# Patient Record
Sex: FEMALE | Race: WHITE | NOT HISPANIC OR LATINO | Employment: FULL TIME | URBAN - NONMETROPOLITAN AREA
[De-identification: names, ages, dates, MRNs, and addresses within clinical notes are randomized per-mention and may not be internally consistent; named-entity substitution may affect disease eponyms.]

---

## 2021-08-04 ENCOUNTER — TRANSFERRED RECORDS (OUTPATIENT)
Dept: HEALTH INFORMATION MANAGEMENT | Facility: OTHER | Age: 36
End: 2021-08-04

## 2021-08-12 ENCOUNTER — TRANSFERRED RECORDS (OUTPATIENT)
Dept: HEALTH INFORMATION MANAGEMENT | Facility: OTHER | Age: 36
End: 2021-08-12

## 2021-08-25 ENCOUNTER — PRENATAL OFFICE VISIT (OUTPATIENT)
Dept: OBGYN | Facility: OTHER | Age: 36
End: 2021-08-25
Attending: OBSTETRICS & GYNECOLOGY
Payer: COMMERCIAL

## 2021-08-25 VITALS — SYSTOLIC BLOOD PRESSURE: 118 MMHG | DIASTOLIC BLOOD PRESSURE: 70 MMHG | WEIGHT: 173.1 LBS | HEART RATE: 76 BPM

## 2021-08-25 DIAGNOSIS — O24.410 GDM, CLASS A1: ICD-10-CM

## 2021-08-25 DIAGNOSIS — Z01.812 PRE-PROCEDURE LAB EXAM: Primary | ICD-10-CM

## 2021-08-25 DIAGNOSIS — Z34.90 NORMAL PREGNANCY, ANTEPARTUM: ICD-10-CM

## 2021-08-25 PROCEDURE — 99207 PR OB VISIT-NO CHARGE - GICH ONLY: CPT | Performed by: OBSTETRICS & GYNECOLOGY

## 2021-08-25 NOTE — PROGRESS NOTES
CC: Recheck OB visit at 38w5d    HPI: Susie Castro presents for a routine OB visit now at 38w5d  Concerns: None  Patient notices normal fetal movement, denies contractions, vaginal bleeding or leaking of fluid.    OB History    Para Term  AB Living   1 0 0 0 0 0   SAB TAB Ectopic Multiple Live Births   0 0 0 0 0      # Outcome Date GA Lbr Terrell/2nd Weight Sex Delivery Anes PTL Lv   1 Current              Current Outpatient Medications   Medication     Prenatal MV-Min-Fe Fum-FA-DHA (PRENATAL 1 PO)     No current facility-administered medications for this visit.         O: /70 (BP Location: Right arm, Patient Position: Sitting, Cuff Size: Adult Regular)   Pulse 76   Wt 78.5 kg (173 lb 1.6 oz)   There is no height or weight on file to calculate BMI.  See OB flow sheet  EXAM:  NAD  EFW 7+ lbs  FHT:125 bpm  Cx 1/50/-2 posterior average consistency, cephalic    No results found for any visits on 21.    A/P: (Z01.812) Pre-procedure lab exam  (primary encounter diagnosis)  Comment:   Plan: Asymptomatic COVID-19 Virus (Coronavirus) by         PCR            (Z34.90) Normal pregnancy, antepartum  Comment:   Plan:     (O24.410) GDM, class A1  Comment:   Plan:     Induction planned for 21 due to 2+ hour distance from the hospital    Problem List:   G1  GDM A1    Benji Cortez MD FACOG  2:17 PM 2021

## 2021-08-25 NOTE — NURSING NOTE
FOOD SECURITY SCREENING QUESTIONS  Hunger Vital Signs:  Within the past 12 months we worried whether our food would run out before we got money to buy more. Never  Within the past 12 months the food we bought just didn't last and we didn't have money to get more. Never    Chief Complaint   Patient presents with     Prenatal Care     39 weeks       Initial There were no vitals taken for this visit. There is no height or weight on file to calculate BMI.  Medication Reconciliation: complete    Payton Price RN

## 2021-08-30 ENCOUNTER — ANESTHESIA EVENT (OUTPATIENT)
Dept: SURGERY | Facility: OTHER | Age: 36
End: 2021-08-30
Payer: COMMERCIAL

## 2021-08-30 ENCOUNTER — ANESTHESIA (OUTPATIENT)
Dept: SURGERY | Facility: OTHER | Age: 36
End: 2021-08-30
Payer: COMMERCIAL

## 2021-08-30 ENCOUNTER — HOSPITAL ENCOUNTER (INPATIENT)
Facility: OTHER | Age: 36
LOS: 2 days | Discharge: HOME | End: 2021-09-01
Attending: OBSTETRICS & GYNECOLOGY | Admitting: OBSTETRICS & GYNECOLOGY
Payer: COMMERCIAL

## 2021-08-30 DIAGNOSIS — Z98.891 S/P CESAREAN SECTION: Primary | ICD-10-CM

## 2021-08-30 PROBLEM — Z37.9 NORMAL LABOR: Status: ACTIVE | Noted: 2021-08-30

## 2021-08-30 LAB
ABO/RH(D): NORMAL
ANTIBODY SCREEN: NEGATIVE
BASOPHILS # BLD AUTO: 0 10E3/UL (ref 0–0.2)
BASOPHILS NFR BLD AUTO: 0 %
EOSINOPHIL # BLD AUTO: 0.4 10E3/UL (ref 0–0.7)
EOSINOPHIL NFR BLD AUTO: 3 %
ERYTHROCYTE [DISTWIDTH] IN BLOOD BY AUTOMATED COUNT: 12.7 % (ref 10–15)
HCT VFR BLD AUTO: 34.4 % (ref 35–47)
HGB BLD-MCNC: 12.2 G/DL (ref 11.7–15.7)
IMM GRANULOCYTES # BLD: 0.1 10E3/UL
IMM GRANULOCYTES NFR BLD: 1 %
LYMPHOCYTES # BLD AUTO: 2 10E3/UL (ref 0.8–5.3)
LYMPHOCYTES NFR BLD AUTO: 15 %
MCH RBC QN AUTO: 31.2 PG (ref 26.5–33)
MCHC RBC AUTO-ENTMCNC: 35.5 G/DL (ref 31.5–36.5)
MCV RBC AUTO: 88 FL (ref 78–100)
MONOCYTES # BLD AUTO: 0.5 10E3/UL (ref 0–1.3)
MONOCYTES NFR BLD AUTO: 4 %
NEUTROPHILS # BLD AUTO: 10.5 10E3/UL (ref 1.6–8.3)
NEUTROPHILS NFR BLD AUTO: 77 %
NRBC # BLD AUTO: 0 10E3/UL
NRBC BLD AUTO-RTO: 0 /100
PLATELET # BLD AUTO: 210 10E3/UL (ref 150–450)
RBC # BLD AUTO: 3.91 10E6/UL (ref 3.8–5.2)
SPECIMEN EXPIRATION DATE: NORMAL
WBC # BLD AUTO: 13.5 10E3/UL (ref 4–11)

## 2021-08-30 PROCEDURE — 250N000009 HC RX 250: Performed by: NURSE ANESTHETIST, CERTIFIED REGISTERED

## 2021-08-30 PROCEDURE — 710N000010 HC RECOVERY PHASE 1, LEVEL 2, PER MIN: Performed by: STUDENT IN AN ORGANIZED HEALTH CARE EDUCATION/TRAINING PROGRAM

## 2021-08-30 PROCEDURE — 250N000011 HC RX IP 250 OP 636: Performed by: OBSTETRICS & GYNECOLOGY

## 2021-08-30 PROCEDURE — 36415 COLL VENOUS BLD VENIPUNCTURE: CPT | Performed by: OBSTETRICS & GYNECOLOGY

## 2021-08-30 PROCEDURE — 370N000017 HC ANESTHESIA TECHNICAL FEE, PER MIN: Performed by: STUDENT IN AN ORGANIZED HEALTH CARE EDUCATION/TRAINING PROGRAM

## 2021-08-30 PROCEDURE — 258N000003 HC RX IP 258 OP 636: Performed by: NURSE ANESTHETIST, CERTIFIED REGISTERED

## 2021-08-30 PROCEDURE — 258N000003 HC RX IP 258 OP 636: Performed by: OBSTETRICS & GYNECOLOGY

## 2021-08-30 PROCEDURE — 59514 CESAREAN DELIVERY ONLY: CPT | Performed by: NURSE ANESTHETIST, CERTIFIED REGISTERED

## 2021-08-30 PROCEDURE — 250N000009 HC RX 250: Performed by: STUDENT IN AN ORGANIZED HEALTH CARE EDUCATION/TRAINING PROGRAM

## 2021-08-30 PROCEDURE — 120N000001 HC R&B MED SURG/OB

## 2021-08-30 PROCEDURE — 59514 CESAREAN DELIVERY ONLY: CPT | Performed by: STUDENT IN AN ORGANIZED HEALTH CARE EDUCATION/TRAINING PROGRAM

## 2021-08-30 PROCEDURE — 250N000011 HC RX IP 250 OP 636: Performed by: NURSE ANESTHETIST, CERTIFIED REGISTERED

## 2021-08-30 PROCEDURE — 86901 BLOOD TYPING SEROLOGIC RH(D): CPT | Performed by: OBSTETRICS & GYNECOLOGY

## 2021-08-30 PROCEDURE — 250N000009 HC RX 250: Performed by: OBSTETRICS & GYNECOLOGY

## 2021-08-30 PROCEDURE — 360N000076 HC SURGERY LEVEL 3, PER MIN: Performed by: STUDENT IN AN ORGANIZED HEALTH CARE EDUCATION/TRAINING PROGRAM

## 2021-08-30 PROCEDURE — 85004 AUTOMATED DIFF WBC COUNT: CPT | Performed by: OBSTETRICS & GYNECOLOGY

## 2021-08-30 PROCEDURE — 272N000001 HC OR GENERAL SUPPLY STERILE: Performed by: STUDENT IN AN ORGANIZED HEALTH CARE EDUCATION/TRAINING PROGRAM

## 2021-08-30 PROCEDURE — 86780 TREPONEMA PALLIDUM: CPT | Performed by: OBSTETRICS & GYNECOLOGY

## 2021-08-30 PROCEDURE — 64488 TAP BLOCK BI INJECTION: CPT | Mod: XU | Performed by: NURSE ANESTHETIST, CERTIFIED REGISTERED

## 2021-08-30 PROCEDURE — 10907ZC DRAINAGE OF AMNIOTIC FLUID, THERAPEUTIC FROM PRODUCTS OF CONCEPTION, VIA NATURAL OR ARTIFICIAL OPENING: ICD-10-PCS | Performed by: OBSTETRICS & GYNECOLOGY

## 2021-08-30 PROCEDURE — 370N000003 HC ANESTHESIA WARD SERVICE

## 2021-08-30 PROCEDURE — 250N000011 HC RX IP 250 OP 636: Performed by: STUDENT IN AN ORGANIZED HEALTH CARE EDUCATION/TRAINING PROGRAM

## 2021-08-30 PROCEDURE — 10H07YZ INSERTION OF OTHER DEVICE INTO PRODUCTS OF CONCEPTION, VIA NATURAL OR ARTIFICIAL OPENING: ICD-10-PCS | Performed by: OBSTETRICS & GYNECOLOGY

## 2021-08-30 RX ORDER — METHYLERGONOVINE MALEATE 0.2 MG/ML
200 INJECTION INTRAVENOUS
Status: DISCONTINUED | OUTPATIENT
Start: 2021-08-30 | End: 2021-08-31 | Stop reason: HOSPADM

## 2021-08-30 RX ORDER — ALBUTEROL SULFATE 0.83 MG/ML
2.5 SOLUTION RESPIRATORY (INHALATION) EVERY 4 HOURS PRN
Status: DISCONTINUED | OUTPATIENT
Start: 2021-08-30 | End: 2021-08-30 | Stop reason: HOSPADM

## 2021-08-30 RX ORDER — PROCHLORPERAZINE MALEATE 10 MG
10 TABLET ORAL EVERY 6 HOURS PRN
Status: DISCONTINUED | OUTPATIENT
Start: 2021-08-30 | End: 2021-08-31 | Stop reason: HOSPADM

## 2021-08-30 RX ORDER — CEFAZOLIN SODIUM 2 G/100ML
2 INJECTION, SOLUTION INTRAVENOUS ONCE
Status: DISCONTINUED | OUTPATIENT
Start: 2021-08-30 | End: 2021-08-31

## 2021-08-30 RX ORDER — TRANEXAMIC ACID 10 MG/ML
1 INJECTION, SOLUTION INTRAVENOUS EVERY 30 MIN PRN
Status: DISCONTINUED | OUTPATIENT
Start: 2021-08-30 | End: 2021-08-31 | Stop reason: HOSPADM

## 2021-08-30 RX ORDER — METOCLOPRAMIDE HYDROCHLORIDE 5 MG/ML
10 INJECTION INTRAMUSCULAR; INTRAVENOUS EVERY 6 HOURS PRN
Status: DISCONTINUED | OUTPATIENT
Start: 2021-08-30 | End: 2021-08-31 | Stop reason: HOSPADM

## 2021-08-30 RX ORDER — KETOROLAC TROMETHAMINE 30 MG/ML
30 INJECTION, SOLUTION INTRAMUSCULAR; INTRAVENOUS
Status: DISCONTINUED | OUTPATIENT
Start: 2021-08-30 | End: 2021-08-31

## 2021-08-30 RX ORDER — NALOXONE HYDROCHLORIDE 0.4 MG/ML
0.4 INJECTION, SOLUTION INTRAMUSCULAR; INTRAVENOUS; SUBCUTANEOUS
Status: DISCONTINUED | OUTPATIENT
Start: 2021-08-30 | End: 2021-08-31 | Stop reason: HOSPADM

## 2021-08-30 RX ORDER — FENTANYL CITRATE 50 UG/ML
50 INJECTION, SOLUTION INTRAMUSCULAR; INTRAVENOUS EVERY 5 MIN PRN
Status: DISCONTINUED | OUTPATIENT
Start: 2021-08-30 | End: 2021-08-30 | Stop reason: HOSPADM

## 2021-08-30 RX ORDER — LIDOCAINE HYDROCHLORIDE 20 MG/ML
INJECTION, SOLUTION EPIDURAL; INFILTRATION; INTRACAUDAL; PERINEURAL PRN
Status: DISCONTINUED | OUTPATIENT
Start: 2021-08-30 | End: 2021-08-30

## 2021-08-30 RX ORDER — OXYTOCIN/0.9 % SODIUM CHLORIDE 30/500 ML
100-340 PLASTIC BAG, INJECTION (ML) INTRAVENOUS CONTINUOUS PRN
Status: DISCONTINUED | OUTPATIENT
Start: 2021-08-30 | End: 2021-08-31

## 2021-08-30 RX ORDER — MEPERIDINE HYDROCHLORIDE 50 MG/ML
12.5 INJECTION INTRAMUSCULAR; INTRAVENOUS; SUBCUTANEOUS
Status: DISCONTINUED | OUTPATIENT
Start: 2021-08-30 | End: 2021-08-30 | Stop reason: HOSPADM

## 2021-08-30 RX ORDER — CEFAZOLIN SODIUM 1 G/3ML
INJECTION, POWDER, FOR SOLUTION INTRAMUSCULAR; INTRAVENOUS PRN
Status: DISCONTINUED | OUTPATIENT
Start: 2021-08-30 | End: 2021-08-30

## 2021-08-30 RX ORDER — SODIUM CHLORIDE, SODIUM LACTATE, POTASSIUM CHLORIDE, CALCIUM CHLORIDE 600; 310; 30; 20 MG/100ML; MG/100ML; MG/100ML; MG/100ML
INJECTION, SOLUTION INTRAVENOUS CONTINUOUS PRN
Status: DISCONTINUED | OUTPATIENT
Start: 2021-08-30 | End: 2021-08-30

## 2021-08-30 RX ORDER — FENTANYL CITRATE-0.9 % NACL/PF 10 MCG/ML
PLASTIC BAG, INJECTION (ML) INTRAVENOUS PRN
Status: DISCONTINUED | OUTPATIENT
Start: 2021-08-30 | End: 2021-08-30

## 2021-08-30 RX ORDER — ONDANSETRON 2 MG/ML
4 INJECTION INTRAMUSCULAR; INTRAVENOUS EVERY 30 MIN PRN
Status: DISCONTINUED | OUTPATIENT
Start: 2021-08-30 | End: 2021-08-30 | Stop reason: HOSPADM

## 2021-08-30 RX ORDER — ONDANSETRON 4 MG/1
4 TABLET, ORALLY DISINTEGRATING ORAL EVERY 6 HOURS PRN
Status: DISCONTINUED | OUTPATIENT
Start: 2021-08-30 | End: 2021-08-31 | Stop reason: HOSPADM

## 2021-08-30 RX ORDER — LIDOCAINE HYDROCHLORIDE AND EPINEPHRINE 15; 5 MG/ML; UG/ML
INJECTION, SOLUTION EPIDURAL PRN
Status: DISCONTINUED | OUTPATIENT
Start: 2021-08-30 | End: 2021-08-30

## 2021-08-30 RX ORDER — NALOXONE HYDROCHLORIDE 0.4 MG/ML
0.2 INJECTION, SOLUTION INTRAMUSCULAR; INTRAVENOUS; SUBCUTANEOUS
Status: DISCONTINUED | OUTPATIENT
Start: 2021-08-30 | End: 2021-08-31 | Stop reason: HOSPADM

## 2021-08-30 RX ORDER — PROCHLORPERAZINE 25 MG
25 SUPPOSITORY, RECTAL RECTAL EVERY 12 HOURS PRN
Status: DISCONTINUED | OUTPATIENT
Start: 2021-08-30 | End: 2021-08-31 | Stop reason: HOSPADM

## 2021-08-30 RX ORDER — FENTANYL CITRATE-0.9 % NACL/PF 10 MCG/ML
100 PLASTIC BAG, INJECTION (ML) INTRAVENOUS EVERY 5 MIN PRN
Status: DISCONTINUED | OUTPATIENT
Start: 2021-08-30 | End: 2021-08-31

## 2021-08-30 RX ORDER — OXYTOCIN 10 [USP'U]/ML
10 INJECTION, SOLUTION INTRAMUSCULAR; INTRAVENOUS
Status: DISCONTINUED | OUTPATIENT
Start: 2021-08-30 | End: 2021-08-31

## 2021-08-30 RX ORDER — ONDANSETRON 2 MG/ML
4 INJECTION INTRAMUSCULAR; INTRAVENOUS EVERY 6 HOURS PRN
Status: DISCONTINUED | OUTPATIENT
Start: 2021-08-30 | End: 2021-08-31 | Stop reason: HOSPADM

## 2021-08-30 RX ORDER — ONDANSETRON 4 MG/1
4 TABLET, ORALLY DISINTEGRATING ORAL EVERY 30 MIN PRN
Status: DISCONTINUED | OUTPATIENT
Start: 2021-08-30 | End: 2021-08-30 | Stop reason: HOSPADM

## 2021-08-30 RX ORDER — HYDROMORPHONE HYDROCHLORIDE 1 MG/ML
0.2 INJECTION, SOLUTION INTRAMUSCULAR; INTRAVENOUS; SUBCUTANEOUS EVERY 5 MIN PRN
Status: DISCONTINUED | OUTPATIENT
Start: 2021-08-30 | End: 2021-08-30 | Stop reason: HOSPADM

## 2021-08-30 RX ORDER — AZITHROMYCIN 500 MG/5ML
500 INJECTION, POWDER, LYOPHILIZED, FOR SOLUTION INTRAVENOUS EVERY 24 HOURS
Status: DISCONTINUED | OUTPATIENT
Start: 2021-08-30 | End: 2021-08-31

## 2021-08-30 RX ORDER — NALBUPHINE HYDROCHLORIDE 10 MG/ML
2.5-5 INJECTION, SOLUTION INTRAMUSCULAR; INTRAVENOUS; SUBCUTANEOUS EVERY 6 HOURS PRN
Status: DISCONTINUED | OUTPATIENT
Start: 2021-08-30 | End: 2021-08-31

## 2021-08-30 RX ORDER — BUPIVACAINE HYDROCHLORIDE 2.5 MG/ML
INJECTION, SOLUTION EPIDURAL; INFILTRATION; INTRACAUDAL PRN
Status: DISCONTINUED | OUTPATIENT
Start: 2021-08-30 | End: 2021-08-30

## 2021-08-30 RX ORDER — OXYTOCIN/0.9 % SODIUM CHLORIDE 30/500 ML
1-24 PLASTIC BAG, INJECTION (ML) INTRAVENOUS CONTINUOUS
Status: DISCONTINUED | OUTPATIENT
Start: 2021-08-30 | End: 2021-08-30

## 2021-08-30 RX ORDER — MAGNESIUM HYDROXIDE 1200 MG/15ML
LIQUID ORAL PRN
Status: DISCONTINUED | OUTPATIENT
Start: 2021-08-30 | End: 2021-08-30 | Stop reason: HOSPADM

## 2021-08-30 RX ORDER — LIDOCAINE HYDROCHLORIDE 10 MG/ML
INJECTION, SOLUTION INFILTRATION; PERINEURAL PRN
Status: DISCONTINUED | OUTPATIENT
Start: 2021-08-30 | End: 2021-08-30

## 2021-08-30 RX ORDER — CARBOPROST TROMETHAMINE 250 UG/ML
250 INJECTION, SOLUTION INTRAMUSCULAR
Status: DISCONTINUED | OUTPATIENT
Start: 2021-08-30 | End: 2021-08-31 | Stop reason: HOSPADM

## 2021-08-30 RX ORDER — SODIUM CHLORIDE, SODIUM LACTATE, POTASSIUM CHLORIDE, CALCIUM CHLORIDE 600; 310; 30; 20 MG/100ML; MG/100ML; MG/100ML; MG/100ML
INJECTION, SOLUTION INTRAVENOUS CONTINUOUS
Status: DISCONTINUED | OUTPATIENT
Start: 2021-08-30 | End: 2021-08-30 | Stop reason: HOSPADM

## 2021-08-30 RX ORDER — METOCLOPRAMIDE 10 MG/1
10 TABLET ORAL EVERY 6 HOURS PRN
Status: DISCONTINUED | OUTPATIENT
Start: 2021-08-30 | End: 2021-08-31 | Stop reason: HOSPADM

## 2021-08-30 RX ORDER — OXYTOCIN 10 [USP'U]/ML
10 INJECTION, SOLUTION INTRAMUSCULAR; INTRAVENOUS
Status: DISCONTINUED | OUTPATIENT
Start: 2021-08-30 | End: 2021-08-31 | Stop reason: HOSPADM

## 2021-08-30 RX ORDER — MISOPROSTOL 100 UG/1
400 TABLET ORAL
Status: DISCONTINUED | OUTPATIENT
Start: 2021-08-30 | End: 2021-08-31 | Stop reason: HOSPADM

## 2021-08-30 RX ORDER — PENICILLIN G 3000000 [IU]/50ML
3 INJECTION, SOLUTION INTRAVENOUS EVERY 4 HOURS
Status: DISCONTINUED | OUTPATIENT
Start: 2021-08-30 | End: 2021-08-31 | Stop reason: HOSPADM

## 2021-08-30 RX ORDER — KETOROLAC TROMETHAMINE 30 MG/ML
INJECTION, SOLUTION INTRAMUSCULAR; INTRAVENOUS PRN
Status: DISCONTINUED | OUTPATIENT
Start: 2021-08-30 | End: 2021-08-30

## 2021-08-30 RX ORDER — OXYCODONE HYDROCHLORIDE 5 MG/1
10 TABLET ORAL EVERY 4 HOURS PRN
Status: DISCONTINUED | OUTPATIENT
Start: 2021-08-30 | End: 2021-09-01 | Stop reason: HOSPADM

## 2021-08-30 RX ORDER — OXYTOCIN/0.9 % SODIUM CHLORIDE 30/500 ML
340 PLASTIC BAG, INJECTION (ML) INTRAVENOUS CONTINUOUS PRN
Status: DISCONTINUED | OUTPATIENT
Start: 2021-08-30 | End: 2021-08-30

## 2021-08-30 RX ORDER — LIDOCAINE 40 MG/G
CREAM TOPICAL
Status: DISCONTINUED | OUTPATIENT
Start: 2021-08-30 | End: 2021-08-31

## 2021-08-30 RX ORDER — IBUPROFEN 600 MG/1
600 TABLET, FILM COATED ORAL
Status: DISCONTINUED | OUTPATIENT
Start: 2021-08-30 | End: 2021-08-31

## 2021-08-30 RX ORDER — SODIUM CHLORIDE, SODIUM LACTATE, POTASSIUM CHLORIDE, CALCIUM CHLORIDE 600; 310; 30; 20 MG/100ML; MG/100ML; MG/100ML; MG/100ML
INJECTION, SOLUTION INTRAVENOUS CONTINUOUS
Status: DISCONTINUED | OUTPATIENT
Start: 2021-08-30 | End: 2021-08-31

## 2021-08-30 RX ORDER — FENTANYL CITRATE 50 UG/ML
INJECTION, SOLUTION INTRAMUSCULAR; INTRAVENOUS PRN
Status: DISCONTINUED | OUTPATIENT
Start: 2021-08-30 | End: 2021-08-30

## 2021-08-30 RX ORDER — DEXAMETHASONE SODIUM PHOSPHATE 10 MG/ML
INJECTION, SOLUTION INTRAMUSCULAR; INTRAVENOUS PRN
Status: DISCONTINUED | OUTPATIENT
Start: 2021-08-30 | End: 2021-08-30

## 2021-08-30 RX ORDER — ONDANSETRON 2 MG/ML
INJECTION INTRAMUSCULAR; INTRAVENOUS PRN
Status: DISCONTINUED | OUTPATIENT
Start: 2021-08-30 | End: 2021-08-30

## 2021-08-30 RX ADMIN — SODIUM CHLORIDE, POTASSIUM CHLORIDE, SODIUM LACTATE AND CALCIUM CHLORIDE 1000 ML: 600; 310; 30; 20 INJECTION, SOLUTION INTRAVENOUS at 13:04

## 2021-08-30 RX ADMIN — LIDOCAINE HYDROCHLORIDE 2 ML: 10 INJECTION, SOLUTION INFILTRATION; PERINEURAL at 13:42

## 2021-08-30 RX ADMIN — LIDOCAINE HYDROCHLORIDE 2 ML: 10 INJECTION, SOLUTION INFILTRATION; PERINEURAL at 18:26

## 2021-08-30 RX ADMIN — DEXTROSE MONOHYDRATE 3 MILLION UNITS: 50 INJECTION, SOLUTION INTRAVENOUS at 12:15

## 2021-08-30 RX ADMIN — SODIUM CHLORIDE, POTASSIUM CHLORIDE, SODIUM LACTATE AND CALCIUM CHLORIDE 1000 ML: 600; 310; 30; 20 INJECTION, SOLUTION INTRAVENOUS at 18:44

## 2021-08-30 RX ADMIN — KETOROLAC TROMETHAMINE 30 MG: 30 INJECTION, SOLUTION INTRAMUSCULAR at 21:29

## 2021-08-30 RX ADMIN — Medication 25 MCG: at 21:54

## 2021-08-30 RX ADMIN — LIDOCAINE HYDROCHLORIDE 2 ML: 10 INJECTION, SOLUTION INFILTRATION; PERINEURAL at 13:48

## 2021-08-30 RX ADMIN — FENTANYL CITRATE 100 MCG: 50 INJECTION, SOLUTION INTRAMUSCULAR; INTRAVENOUS at 21:03

## 2021-08-30 RX ADMIN — CEFAZOLIN 2 G: 1 INJECTION, POWDER, FOR SOLUTION INTRAMUSCULAR; INTRAVENOUS at 21:04

## 2021-08-30 RX ADMIN — LIDOCAINE HYDROCHLORIDE AND EPINEPHRINE 5 ML: 15; 5 INJECTION, SOLUTION EPIDURAL at 18:30

## 2021-08-30 RX ADMIN — SODIUM CHLORIDE 5 MILLION UNITS: 900 INJECTION INTRAVENOUS at 08:20

## 2021-08-30 RX ADMIN — SODIUM CHLORIDE, POTASSIUM CHLORIDE, SODIUM LACTATE AND CALCIUM CHLORIDE 1000 ML: 600; 310; 30; 20 INJECTION, SOLUTION INTRAVENOUS at 08:20

## 2021-08-30 RX ADMIN — BUPIVACAINE HYDROCHLORIDE 30 ML: 2.5 INJECTION, SOLUTION EPIDURAL; INFILTRATION; INTRACAUDAL; PERINEURAL at 21:51

## 2021-08-30 RX ADMIN — DEXTROSE MONOHYDRATE 3 MILLION UNITS: 50 INJECTION, SOLUTION INTRAVENOUS at 16:00

## 2021-08-30 RX ADMIN — Medication 100 MCG: at 21:22

## 2021-08-30 RX ADMIN — Medication 500 MG: at 21:09

## 2021-08-30 RX ADMIN — Medication 100 MCG: at 19:09

## 2021-08-30 RX ADMIN — LIDOCAINE HYDROCHLORIDE 2 ML: 10 INJECTION, SOLUTION INFILTRATION; PERINEURAL at 13:39

## 2021-08-30 RX ADMIN — DEXTROSE MONOHYDRATE 3 MILLION UNITS: 50 INJECTION, SOLUTION INTRAVENOUS at 20:09

## 2021-08-30 RX ADMIN — Medication 10 ML/HR: at 14:00

## 2021-08-30 RX ADMIN — DEXAMETHASONE SODIUM PHOSPHATE 5 MG: 10 INJECTION, SOLUTION INTRAMUSCULAR; INTRAVENOUS at 21:51

## 2021-08-30 RX ADMIN — BUPIVACAINE HYDROCHLORIDE 30 ML: 2.5 INJECTION, SOLUTION EPIDURAL; INFILTRATION; INTRACAUDAL; PERINEURAL at 21:54

## 2021-08-30 RX ADMIN — LIDOCAINE HYDROCHLORIDE 5 ML: 20 INJECTION, SOLUTION EPIDURAL; INFILTRATION; INTRACAUDAL; PERINEURAL at 20:35

## 2021-08-30 RX ADMIN — LIDOCAINE HYDROCHLORIDE 5 ML: 20 INJECTION, SOLUTION EPIDURAL; INFILTRATION; INTRACAUDAL; PERINEURAL at 21:03

## 2021-08-30 RX ADMIN — ONDANSETRON HYDROCHLORIDE 4 MG: 2 SOLUTION INTRAMUSCULAR; INTRAVENOUS at 21:29

## 2021-08-30 RX ADMIN — Medication 200 MCG: at 21:30

## 2021-08-30 RX ADMIN — Medication 2 MILLI-UNITS/MIN: at 09:32

## 2021-08-30 RX ADMIN — DEXAMETHASONE SODIUM PHOSPHATE 5 MG: 10 INJECTION, SOLUTION INTRAMUSCULAR; INTRAVENOUS at 21:54

## 2021-08-30 RX ADMIN — LIDOCAINE HYDROCHLORIDE AND EPINEPHRINE 5 ML: 15; 5 INJECTION, SOLUTION EPIDURAL at 13:54

## 2021-08-30 RX ADMIN — SODIUM CHLORIDE, SODIUM LACTATE, POTASSIUM CHLORIDE, AND CALCIUM CHLORIDE: 600; 310; 30; 20 INJECTION, SOLUTION INTRAVENOUS at 21:01

## 2021-08-30 RX ADMIN — Medication: at 20:10

## 2021-08-30 RX ADMIN — LIDOCAINE HYDROCHLORIDE 5 ML: 20 INJECTION, SOLUTION EPIDURAL; INFILTRATION; INTRACAUDAL; PERINEURAL at 21:08

## 2021-08-30 RX ADMIN — Medication 100 MCG: at 21:18

## 2021-08-30 RX ADMIN — Medication 25 MCG: at 21:51

## 2021-08-30 RX ADMIN — ONDANSETRON 4 MG: 2 INJECTION INTRAMUSCULAR; INTRAVENOUS at 20:54

## 2021-08-30 RX ADMIN — Medication 200 MCG: at 21:28

## 2021-08-30 ASSESSMENT — ACTIVITIES OF DAILY LIVING (ADL)
TOILETING_ISSUES: NO
FALL_HISTORY_WITHIN_LAST_SIX_MONTHS: NO

## 2021-08-30 NOTE — PROGRESS NOTES
MD notified of adequate MVUs, minimal variability and no cervical change. Will continue to stay with the course and will turn down pitocin if needed. Karishma Nieto, ELIOT on 8/30/2021 at 3:20 PM

## 2021-08-30 NOTE — PROGRESS NOTES
Patient complaining of right hip pain. Rotated onto left side and applied heat packs. Effective in helping reduce patients pain. Patient reports that she doesn't feel like her epidural is working. Checked dermatomes and patient is able to feel cool and wet sensation. Patient administered bolus to see if it is helpful.     Cervix 2.5/75/-1. Pitocin decreased to 4 aminta/units per hour due to MVU's 235-305.     Kemar Smith RN 08/30/21 4:45 PM

## 2021-08-30 NOTE — PROGRESS NOTES
39w3d patient here for her scheduled induction. EFM/toco applied. No contractions present, category 1 tracing noted. 1-2/50 SVE by MD. Patient oriented to her room and call light, Penicillin infusing for GBS+. Karishma Nieto RN on 8/30/2021 at 0830 AM

## 2021-08-30 NOTE — ANESTHESIA PREPROCEDURE EVALUATION
Anesthesia Pre-Procedure Evaluation    Patient: Susie Castro   MRN: [id number] : 1985        Preoperative Diagnosis: * No pre-op diagnosis entered *   Procedure : * No procedures listed *     History reviewed. No pertinent past medical history.   History reviewed. No pertinent surgical history.   No Known Allergies   Social History     Tobacco Use     Smoking status: Never Smoker     Smokeless tobacco: Never Used   Substance Use Topics     Alcohol use: Never      Wt Readings from Last 1 Encounters:   21 78.5 kg (173 lb 1.6 oz)        Anesthesia Evaluation   Pt has had prior anesthetic.     No history of anesthetic complications       ROS/MED HX  ENT/Pulmonary:  - neg pulmonary ROS     Neurologic:  - neg neurologic ROS     Cardiovascular:  - neg cardiovascular ROS     METS/Exercise Tolerance: >4 METS    Hematologic:  - neg hematologic  ROS     Musculoskeletal:       GI/Hepatic:     (+) GERD,     Renal/Genitourinary:       Endo:  - neg endo ROS     Psychiatric/Substance Use:  - neg psychiatric ROS     Infectious Disease:  - neg infectious disease ROS     Malignancy:       Other:      (+) Possibly pregnant, ,  (-) previous  and TOLAC candidate       Physical Exam    Airway  airway exam normal      Mallampati: II   TM distance: > 3 FB   Neck ROM: full   Mouth opening: > 3 cm    Respiratory Devices and Support         Dental  no notable dental history         Cardiovascular   cardiovascular exam normal          Pulmonary   pulmonary exam normal                OUTSIDE LABS:  CBC:   Lab Results   Component Value Date    WBC 13.5 (H) 2021    HGB 12.2 2021    HCT 34.4 (L) 2021     2021     BMP: No results found for: NA, POTASSIUM, CHLORIDE, CO2, BUN, CR, GLC  COAGS: No results found for: PTT, INR, FIBR  POC: No results found for: BGM, HCG, HCGS  HEPATIC: No results found for: ALBUMIN, PROTTOTAL, ALT, AST, GGT, ALKPHOS, BILITOTAL, BILIDIRECT, SKYLER  OTHER: No results  found for: PH, LACT, A1C, JACQUELINE, PHOS, MAG, LIPASE, AMYLASE, TSH, T4, T3, CRP, SED    Anesthesia Plan    ASA Status:  2   NPO Status:  NPO Appropriate    Anesthesia Type: Epidural.              Consents    Anesthesia Plan(s) and associated risks, benefits, and realistic alternatives discussed. Questions answered and patient/representative(s) expressed understanding.     - Discussed with:  Patient, Spouse         Postoperative Care            Comments:    Epidural working well, plan to perform  with epidural.        neg OB ROS.       Stephen Goldman APRN CRNA

## 2021-08-30 NOTE — PROGRESS NOTES
Patient sleeping at this time. Contractions more irregular. MVU's 210-220.     Kemar Smith RN 08/30/21 4:48 PM

## 2021-08-30 NOTE — ANESTHESIA PROCEDURE NOTES
Epidural catheter Procedure Note  Pre-Procedure   Staff -        CRNA: Stephen Goldman APRN CRNA       Performed By: CRNA       Location: OB       Procedure Start/Stop Times: 8/30/2021 6:18 PM and 8/30/2021 6:41 PM       Pre-Anesthestic Checklist: patient identified, IV checked, risks and benefits discussed, informed consent, monitors and equipment checked, pre-op evaluation, at physician/surgeon's request and post-op pain management  Timeout:       Correct Patient: Yes        Correct Procedure: Yes        Correct Site: Yes        Correct Position: Yes   Procedure Documentation  Procedure: epidural catheter       Diagnosis: Labor Pain       Patient Position: sitting       Patient Prep/Sterile Barriers: sterile gloves, mask, patient draped       Skin prep: Chloraprep       Local skin infiltrated with 2 mL of 1% lidocaine.        Insertion Site: L3-4. (midline approach).       Technique: LORT air        WIN at 7 cm.       Needle Type: Touhy needle       Needle Gauge: 17.        Needle Length (Inches): 3.5        Catheter: 20 G.         Catheter threaded easily.         Threaded 14 cm at skin.         # of attempts: 1 and  # of redirects:  0    Assessment/Narrative         Paresthesias: No.       Test dose of 5 mL lidocaine 1.5% w/ 1:200,000 epinephrine at 18:30 CDT.         Test dose negative, 3 minutes after injection, for signs of intravascular, subdural, or intrathecal injection.       Insertion/Infusion Method: LORT air       Aspiration negative for Heme or CSF via Epidural Catheter.

## 2021-08-30 NOTE — PROGRESS NOTES
cx /-1 cephalic  AROM with bloody fluid with Amni-hook and placed IUPC    I/P  CPM- pitocin per protocol  Epidural prn  Anticipate     Benji Cortez MD FACOG  12:10 PM 2021

## 2021-08-30 NOTE — PROGRESS NOTES
cx 2-3/75/-1 cephalic  's moderate variability, no accels, no decels  IUPC- q 2-4 min with adequate MVU's  Pitocin currently at 4 mIU's/min  Epidural placed but not opitmally working    Discussed goals of her labor. She would like to continue with her induction at this time.  Suspect she is not in active labor yet.  Would suggest replacing her epidural for pain management  Recheck in 2 hours.   if fetus is non-reassuring, signs of infection develop, or no progress and patient desires operative delivery.    Benji Cortez MD FACOG  6:15 PM 2021

## 2021-08-30 NOTE — H&P
[facility]    History and Physical  Obstetrics and Gynecology     Date of Admission:  2021    Assessment & Plan   Susie Castro is a 35 year old female who presents with labor induction for indication of distance from hospital  ASSESSMENT:   IUP @ 39w3d for induction of labor.  Indication is social living two hours from the hospital.  NST reactive.  Category  I    PLAN:   Admit - see IP orders    Benji Cortez    History of Present Illness   Susie Castro is a 35 year old female  39w3d  Estimated Date of Delivery: Sep 3, 2021 is calculated from No LMP recorded. Patient is pregnant. [facility].  Indication social with distance from hospital and AMA as indications.    PRENATAL COURSE  Prenatal course was essentially uncomplicated      No lab results found.  Rhogam not indicated   No lab results found.    Past Medical History    I have reviewed this patient's medical history and updated it with pertinent information if needed.   No past medical history on file.    Past Surgical History   I have reviewed this patient's surgical history and updated it with pertinent information if needed.  No past surgical history on file.    Prior to Admission Medications   Prior to Admission Medications   Prescriptions Last Dose Informant Patient Reported? Taking?   Prenatal MV-Min-Fe Fum-FA-DHA (PRENATAL 1 PO)   Yes No      Facility-Administered Medications: None     Allergies   No Known Allergies    Social History   I have reviewed this patient's social history and updated it with pertinent information if needed. Susie Castro  reports that she has never smoked. She has never used smokeless tobacco. She reports that she does not drink alcohol and does not use drugs.    Family History   I have reviewed this patient's family history and updated it with pertinent information if needed.   No family history on file.    Immunization History   Immunizations are up  to date    Physical Exam                      Vital Signs with Ranges       Abdomen: gravid, single vertex fetus, non-tender, EFW 7 lbs 6  Cervical Exam: 2/ 50/ Mid/ soft/ 0     Fetal Heart Tones: 140 baseline, moderate variablility, + accels, no decels and Category I  TOCO:   Not nuha  Constitutional: healthy, alert, active and no distress   Respiratory: No increased work of breathing, good air exchange, clear to auscultation bilaterally, no crackles or wheezing  Cardiovascular: Normal apical impulse, regular rate and rhythm, normal S1 and S2, no S3 or S4, and no murmur noted  Skin/Extremites: no rashes and no lesions  Neurologic: A&O x 3.

## 2021-08-30 NOTE — ANESTHESIA PROCEDURE NOTES
Epidural catheter Procedure Note  Pre-Procedure   Staff -        CRNA: Stephen Goldman APRN CRNA       Performed By: CRNA       Location: OB       Procedure Start/Stop Times: 8/30/2021 1:33 PM and 8/30/2021 2:05 PM       Pre-Anesthestic Checklist: patient identified, IV checked, risks and benefits discussed, informed consent, monitors and equipment checked, pre-op evaluation, at physician/surgeon's request and post-op pain management  Timeout:       Correct Patient: Yes        Correct Procedure: Yes        Correct Site: Yes        Correct Position: Yes   Procedure Documentation  Procedure: epidural catheter       Diagnosis: Labor Pain       Patient Position: sitting       Patient Prep/Sterile Barriers: sterile gloves, mask, patient draped       Skin prep: Chloraprep       Local skin infiltrated with 6 mL of 1% lidocaine.        Insertion Site: L3-4. (midline approach).       Technique: LORT air        WIN at 7 cm.       Needle Type: Touhy needle       Needle Gauge: 18.        Needle Length (Inches): 3.5        Catheter: 20 G.         Catheter threaded easily.         Threaded 15 cm at skin.         # of attempts: 3 and  # of redirects:  2    Assessment/Narrative         Paresthesias: No.       Test dose of 5 mL lidocaine 1.5% w/ 1:200,000 epinephrine at 13:54 CDT.         Test dose negative, 3 minutes after injection, for signs of intravascular, subdural, or intrathecal injection.       Insertion/Infusion Method: LORT air       Aspiration negative for Heme or CSF via Epidural Catheter.

## 2021-08-31 LAB
HGB BLD-MCNC: 10.9 G/DL (ref 11.7–15.7)
T PALLIDUM AB SER QL: NONREACTIVE

## 2021-08-31 PROCEDURE — 250N000009 HC RX 250

## 2021-08-31 PROCEDURE — 120N000001 HC R&B MED SURG/OB

## 2021-08-31 PROCEDURE — 250N000011 HC RX IP 250 OP 636: Performed by: STUDENT IN AN ORGANIZED HEALTH CARE EDUCATION/TRAINING PROGRAM

## 2021-08-31 PROCEDURE — 250N000013 HC RX MED GY IP 250 OP 250 PS 637: Performed by: STUDENT IN AN ORGANIZED HEALTH CARE EDUCATION/TRAINING PROGRAM

## 2021-08-31 PROCEDURE — 99207 PR NO CHARGE LOS: CPT | Performed by: OBSTETRICS & GYNECOLOGY

## 2021-08-31 PROCEDURE — 85018 HEMOGLOBIN: CPT | Performed by: STUDENT IN AN ORGANIZED HEALTH CARE EDUCATION/TRAINING PROGRAM

## 2021-08-31 PROCEDURE — 36415 COLL VENOUS BLD VENIPUNCTURE: CPT | Performed by: STUDENT IN AN ORGANIZED HEALTH CARE EDUCATION/TRAINING PROGRAM

## 2021-08-31 RX ORDER — OXYCODONE HYDROCHLORIDE 5 MG/1
5 TABLET ORAL EVERY 4 HOURS PRN
Status: DISCONTINUED | OUTPATIENT
Start: 2021-08-31 | End: 2021-09-01 | Stop reason: HOSPADM

## 2021-08-31 RX ORDER — MISOPROSTOL 100 UG/1
400 TABLET ORAL
Status: DISCONTINUED | OUTPATIENT
Start: 2021-08-31 | End: 2021-09-01 | Stop reason: HOSPADM

## 2021-08-31 RX ORDER — NALOXONE HYDROCHLORIDE 0.4 MG/ML
0.4 INJECTION, SOLUTION INTRAMUSCULAR; INTRAVENOUS; SUBCUTANEOUS
Status: DISCONTINUED | OUTPATIENT
Start: 2021-08-31 | End: 2021-09-01 | Stop reason: HOSPADM

## 2021-08-31 RX ORDER — ONDANSETRON 4 MG/1
4 TABLET, ORALLY DISINTEGRATING ORAL EVERY 6 HOURS PRN
Status: DISCONTINUED | OUTPATIENT
Start: 2021-08-31 | End: 2021-09-01 | Stop reason: HOSPADM

## 2021-08-31 RX ORDER — CARBOPROST TROMETHAMINE 250 UG/ML
250 INJECTION, SOLUTION INTRAMUSCULAR
Status: DISCONTINUED | OUTPATIENT
Start: 2021-08-31 | End: 2021-09-01 | Stop reason: HOSPADM

## 2021-08-31 RX ORDER — IBUPROFEN 400 MG/1
800 TABLET, FILM COATED ORAL EVERY 6 HOURS
Status: DISCONTINUED | OUTPATIENT
Start: 2021-08-31 | End: 2021-09-01 | Stop reason: HOSPADM

## 2021-08-31 RX ORDER — PROCHLORPERAZINE MALEATE 10 MG
10 TABLET ORAL EVERY 6 HOURS PRN
Status: DISCONTINUED | OUTPATIENT
Start: 2021-08-31 | End: 2021-09-01 | Stop reason: HOSPADM

## 2021-08-31 RX ORDER — OXYTOCIN 10 [USP'U]/ML
10 INJECTION, SOLUTION INTRAMUSCULAR; INTRAVENOUS
Status: DISCONTINUED | OUTPATIENT
Start: 2021-08-31 | End: 2021-09-01 | Stop reason: HOSPADM

## 2021-08-31 RX ORDER — METOCLOPRAMIDE 10 MG/1
10 TABLET ORAL EVERY 6 HOURS PRN
Status: DISCONTINUED | OUTPATIENT
Start: 2021-08-31 | End: 2021-09-01 | Stop reason: HOSPADM

## 2021-08-31 RX ORDER — HYDROCORTISONE 2.5 %
CREAM (GRAM) TOPICAL 3 TIMES DAILY PRN
Status: DISCONTINUED | OUTPATIENT
Start: 2021-08-31 | End: 2021-09-01 | Stop reason: HOSPADM

## 2021-08-31 RX ORDER — NALOXONE HYDROCHLORIDE 0.4 MG/ML
0.2 INJECTION, SOLUTION INTRAMUSCULAR; INTRAVENOUS; SUBCUTANEOUS
Status: DISCONTINUED | OUTPATIENT
Start: 2021-08-31 | End: 2021-09-01 | Stop reason: HOSPADM

## 2021-08-31 RX ORDER — AMOXICILLIN 250 MG
2 CAPSULE ORAL 2 TIMES DAILY
Status: DISCONTINUED | OUTPATIENT
Start: 2021-08-31 | End: 2021-09-01 | Stop reason: HOSPADM

## 2021-08-31 RX ORDER — ACETAMINOPHEN 325 MG/1
975 TABLET ORAL EVERY 6 HOURS
Status: DISCONTINUED | OUTPATIENT
Start: 2021-08-31 | End: 2021-09-01 | Stop reason: HOSPADM

## 2021-08-31 RX ORDER — BISACODYL 10 MG
10 SUPPOSITORY, RECTAL RECTAL DAILY PRN
Status: DISCONTINUED | OUTPATIENT
Start: 2021-09-01 | End: 2021-09-01 | Stop reason: HOSPADM

## 2021-08-31 RX ORDER — TRANEXAMIC ACID 10 MG/ML
1 INJECTION, SOLUTION INTRAVENOUS EVERY 30 MIN PRN
Status: DISCONTINUED | OUTPATIENT
Start: 2021-08-31 | End: 2021-09-01 | Stop reason: HOSPADM

## 2021-08-31 RX ORDER — ONDANSETRON 2 MG/ML
4 INJECTION INTRAMUSCULAR; INTRAVENOUS EVERY 6 HOURS PRN
Status: DISCONTINUED | OUTPATIENT
Start: 2021-08-31 | End: 2021-09-01 | Stop reason: HOSPADM

## 2021-08-31 RX ORDER — METHYLERGONOVINE MALEATE 0.2 MG/ML
200 INJECTION INTRAVENOUS
Status: DISCONTINUED | OUTPATIENT
Start: 2021-08-31 | End: 2021-09-01 | Stop reason: HOSPADM

## 2021-08-31 RX ORDER — OXYTOCIN/0.9 % SODIUM CHLORIDE 30/500 ML
340 PLASTIC BAG, INJECTION (ML) INTRAVENOUS CONTINUOUS PRN
Status: DISCONTINUED | OUTPATIENT
Start: 2021-08-31 | End: 2021-09-01 | Stop reason: HOSPADM

## 2021-08-31 RX ORDER — KETOROLAC TROMETHAMINE 30 MG/ML
30 INJECTION, SOLUTION INTRAMUSCULAR; INTRAVENOUS EVERY 6 HOURS
Status: COMPLETED | OUTPATIENT
Start: 2021-08-31 | End: 2021-08-31

## 2021-08-31 RX ORDER — DEXTROSE, SODIUM CHLORIDE, SODIUM LACTATE, POTASSIUM CHLORIDE, AND CALCIUM CHLORIDE 5; .6; .31; .03; .02 G/100ML; G/100ML; G/100ML; G/100ML; G/100ML
INJECTION, SOLUTION INTRAVENOUS CONTINUOUS
Status: DISCONTINUED | OUTPATIENT
Start: 2021-08-31 | End: 2021-09-01 | Stop reason: HOSPADM

## 2021-08-31 RX ORDER — AMOXICILLIN 250 MG
1 CAPSULE ORAL 2 TIMES DAILY
Status: DISCONTINUED | OUTPATIENT
Start: 2021-08-31 | End: 2021-09-01 | Stop reason: HOSPADM

## 2021-08-31 RX ORDER — LIDOCAINE 40 MG/G
CREAM TOPICAL
Status: DISCONTINUED | OUTPATIENT
Start: 2021-08-31 | End: 2021-09-01 | Stop reason: HOSPADM

## 2021-08-31 RX ORDER — METOCLOPRAMIDE HYDROCHLORIDE 5 MG/ML
10 INJECTION INTRAMUSCULAR; INTRAVENOUS EVERY 6 HOURS PRN
Status: DISCONTINUED | OUTPATIENT
Start: 2021-08-31 | End: 2021-09-01 | Stop reason: HOSPADM

## 2021-08-31 RX ORDER — MODIFIED LANOLIN
OINTMENT (GRAM) TOPICAL
Status: DISCONTINUED | OUTPATIENT
Start: 2021-08-31 | End: 2021-09-01 | Stop reason: HOSPADM

## 2021-08-31 RX ORDER — PROCHLORPERAZINE 25 MG
25 SUPPOSITORY, RECTAL RECTAL EVERY 12 HOURS PRN
Status: DISCONTINUED | OUTPATIENT
Start: 2021-08-31 | End: 2021-09-01 | Stop reason: HOSPADM

## 2021-08-31 RX ORDER — SIMETHICONE 80 MG
80 TABLET,CHEWABLE ORAL 4 TIMES DAILY PRN
Status: DISCONTINUED | OUTPATIENT
Start: 2021-08-31 | End: 2021-09-01 | Stop reason: HOSPADM

## 2021-08-31 RX ADMIN — OXYCODONE HYDROCHLORIDE 5 MG: 5 TABLET ORAL at 13:25

## 2021-08-31 RX ADMIN — DOCUSATE SODIUM 50 MG AND SENNOSIDES 8.6 MG 1 TABLET: 8.6; 5 TABLET, FILM COATED ORAL at 21:56

## 2021-08-31 RX ADMIN — ACETAMINOPHEN 975 MG: 325 TABLET, FILM COATED ORAL at 06:29

## 2021-08-31 RX ADMIN — ACETAMINOPHEN 975 MG: 325 TABLET, FILM COATED ORAL at 12:14

## 2021-08-31 RX ADMIN — IBUPROFEN 800 MG: 400 TABLET ORAL at 21:56

## 2021-08-31 RX ADMIN — ACETAMINOPHEN 975 MG: 325 TABLET, FILM COATED ORAL at 00:50

## 2021-08-31 RX ADMIN — KETOROLAC TROMETHAMINE 30 MG: 30 INJECTION, SOLUTION INTRAMUSCULAR; INTRAVENOUS at 04:12

## 2021-08-31 RX ADMIN — SODIUM CHLORIDE, SODIUM LACTATE, POTASSIUM CHLORIDE, CALCIUM CHLORIDE AND DEXTROSE MONOHYDRATE: 5; 600; 310; 30; 20 INJECTION, SOLUTION INTRAVENOUS at 00:50

## 2021-08-31 RX ADMIN — ACETAMINOPHEN 975 MG: 325 TABLET, FILM COATED ORAL at 18:36

## 2021-08-31 RX ADMIN — KETOROLAC TROMETHAMINE 30 MG: 30 INJECTION, SOLUTION INTRAMUSCULAR; INTRAVENOUS at 10:07

## 2021-08-31 RX ADMIN — KETOROLAC TROMETHAMINE 30 MG: 30 INJECTION, SOLUTION INTRAMUSCULAR; INTRAVENOUS at 15:57

## 2021-08-31 RX ADMIN — OXYCODONE HYDROCHLORIDE 5 MG: 5 TABLET ORAL at 18:37

## 2021-08-31 RX ADMIN — DOCUSATE SODIUM 50 MG AND SENNOSIDES 8.6 MG 1 TABLET: 8.6; 5 TABLET, FILM COATED ORAL at 10:07

## 2021-08-31 NOTE — ANESTHESIA POSTPROCEDURE EVALUATION
Patient: Susie Castro    Procedure(s):   SECTION    Diagnosis:Failure to progress in labor [O62.2]  Diagnosis Additional Information: No value filed.    Anesthesia Type:  Epidural    Note:  Disposition: Inpatient   Postop Pain Control: Uneventful            Sign Out: Well controlled pain   PONV: No   Neuro/Psych: Uneventful            Sign Out: Acceptable/Baseline neuro status   Airway/Respiratory: Uneventful            Sign Out: Acceptable/Baseline resp. status   CV/Hemodynamics: Uneventful            Sign Out: Acceptable CV status; No obvious hypovolemia; No obvious fluid overload   Other NRE: NONE   DID A NON-ROUTINE EVENT OCCUR? No     Epidural-to- Updated ASA: 2      Last vitals:  Vitals Value Taken Time   /65 21 2245   Temp 97.2  F (36.2  C) 21 2230   Pulse 71 21 2245   Resp 11 21 2245   SpO2 96 % 21 2245   Vitals shown include unvalidated device data.    Electronically Signed By: NURY Anderson CRNA  2021  12:11 AM

## 2021-08-31 NOTE — ANESTHESIA PROCEDURE NOTES
TAP Procedure Note  Pre-Procedure   Staff -        CRNA: Stephen Goldman APRN CRNA       Performed By: CRNA       Location: OR       Procedure Start/Stop Times: 2021 9:49 PM and 2021 9:55 PM       Pre-Anesthestic Checklist: patient identified, IV checked, site marked, risks and benefits discussed, informed consent, monitors and equipment checked, pre-op evaluation, at physician/surgeon's request and post-op pain management  Timeout:       Correct Patient: Yes        Correct Procedure: Yes        Correct Site: Yes        Correct Position: Yes        Correct Laterality: Yes        Site Marked: Yes  Procedure Documentation  Procedure: TAP       Diagnosis: ABDOMINAL PAIN       Laterality: bilateral       Patient Position: supine       Patient Prep/Sterile Barriers: sterile gloves, mask       Skin prep: Chloraprep       Needle Type: short bevel       Needle Gauge: 20.        Needle Length (Inches): 6        Ultrasound guided       1. Ultrasound was used to identify targeted nerve, plexus, vascular marker, or fascial plane and place a needle adjacent to it in real-time.       2. Ultrasound was used to visualize the spread of anesthetic in close proximity to the above referenced structure.       3. A permanent image is entered into the patient's record.       4. The visualized anatomic structures appeared normal.       5. There were no apparent abnormal pathologic findings.    Assessment/Narrative         The placement was negative for: blood aspirated, painful injection and site bleeding       Paresthesias: No.     Bolus given via needle..        Secured via.        Insertion/Infusion Method: Single Shot       Complications: none    Comments:  Bilateral Transversus abdominus plane (TAP) block for post-op pain management. Ultrasound guidance used and images saved.    Patient: PATRICIA BARILLAS  Patient : 1985  Date: 2021  Procedure time:  2149  Location: Operating Room  Diagnosis:  abdominal pain.  Indication: Surgeon requests block for post-op pain management.    The procedure, potential benefits, risks, and alternatives were discussed during thepreoperative interview with the patient. The patient voiced understanding of the information and agreed to proceed.    A pause was conducted to verify correct patient ID and surgical site utilizing the written consentform and patient feedback.    Anesthesia: epidural  Block was performed pre-op in OR following delivery    The right abdomen in the midaxillary line was prepped with chlorhexidine. A 20G 6 inch spinal needle was inserted within the triangle of petit and advanced with ultrasound guidance until the needle tip was visualized between the fascial layers of the internal oblique and transversus abdominus. Injection of a small amount of fluid was utilized to confirm correct placement. 30ml of 0.25% ropivacaine with 5mg of preservative free decadron and 25 mcg of precedex was injected incrementally, with confirmation of negative aspiration and visualization of hydro-dissection of local anesthetic between the muscle layers.     The left abdomen in the midaxillary line wasprepped with chlorhexidine. A 20G 6 inch spinal needle was inserted within the triangle of petit and advanced with ultrasound guidance until the needle tip was visualized between the fascial layers of the internal oblique and transversus abdominus. Injection of a small amount of fluid was utilized to confirm correct placement. 30ml of 0.25% ropivacaine with 5mg of preservative free decadron and 25 mcg of precedex was injected incrementally, with confirmation of negative aspiration and visualization of hydro-dissection of local anesthetic between the muscle layers.     An image was saved to the ultrasound machine and prints were made for the chart.     Electronically signedby  NURY Anderson CRNA   8/30/2021  8:55 PM

## 2021-08-31 NOTE — OR NURSING
PACU Transfer Note    Susie Castro was transferred to [facility] via bed.  Equipment used for transport:  none.  Accompanied by:  rn  Prescriptions were: none    PACU Respiratory Event Documentation     1) Episodes of Apnea greater than or equal to 10 seconds: no    2) Bradypnea - less than 8 breaths per minute: no    3) Pain score on 0 to 10 scale: 0    4) Pain-sedation mismatch (yes or no): no    5) Repeated 02 desaturation less than 90% (yes or no): no    Anesthesia notified? (yes or no): no    Any of the above events occuring repeatedly in separate 30 minute intervals may be considered recurrent PACU respiratory events.    Patient stable and meets phase 1 discharge criteria for transport from PACU.  Report given to Radha MCCABE

## 2021-08-31 NOTE — PROGRESS NOTES
[facility]    Obstetrics Post-Op / Progress Note    Assessment & Plan   Assessment:  -1 Day Post-Op  Procedure(s):   SECTION    Doing well.  No immediate surgical complications identified.  Pain well-controlled.    Plan:  Ambulation encouraged  Pain control measures as needed  Anticipate discharge tomorrow    Benji Cortez     Interval History   Doing well.  Pain is well-controlled.  No fevers.  No history of wound drainage, warmth or significant erythema.  Good appetite.  Denies chest pain, shortness of breath, nausea or vomiting.  Ambulatory.  Breastfeeding well.    Medications     dextrose 5% lactated ringers Stopped (21)     - MEDICATION INSTRUCTIONS -       - MEDICATION INSTRUCTIONS -       oxytocin in 0.9% NaCl         acetaminophen  975 mg Oral Q6H     ibuprofen  800 mg Oral Q6H     ketorolac  30 mg Intravenous Q6H     senna-docusate  1 tablet Oral BID    Or     senna-docusate  2 tablet Oral BID     sodium chloride (PF)  3 mL Intracatheter Q8H       Physical Exam   Temp: (!) 96.3  F (35.7  C) Temp src: Temporal BP: 111/70 Pulse: 71   Resp: 11 SpO2: 96 % O2 Device: None (Room air)    There were no vitals filed for this visit.  Vital Signs with Ranges  Temp:  [96.3  F (35.7  C)-98.1  F (36.7  C)] 96.3  F (35.7  C)  Pulse:  [71-76] 71  Resp:  [10-35] 11  BP: ()/(49-84) 111/70  SpO2:  [88 %-100 %] 96 %  I/O last 3 completed shifts:  In: 400 [I.V.:400]  Out: 1385 [Urine:650; Blood:735]    Uterine fundus is firm, non-tender and at the level of the umbilicus  Incision C/D/I  Extremities Non-tender    Data   Recent Labs   Lab Test 21   AS Negative     Recent Labs   Lab Test 21   HGB 12.2     No lab results found.

## 2021-08-31 NOTE — ANESTHESIA CARE TRANSFER NOTE
Patient: Susie Castro    * No procedures listed *    Diagnosis: * No pre-op diagnosis entered *  Diagnosis Additional Information: No value filed.    Anesthesia Type:   Epidural     Note:    Oropharynx: oropharynx clear of all foreign objects  Level of Consciousness: awake  Oxygen Supplementation: room air    Independent Airway: airway patency satisfactory and stable  Dentition: dentition unchanged  Vital Signs Stable: post-procedure vital signs reviewed and stable  Report to RN Given: handoff report given  Patient transferred to: PACU    Handoff Report: Identifed the Patient, Identified the Reponsible Provider, Reviewed the pertinent medical history, Discussed the surgical course, Reviewed Intra-OP anesthesia mangement and issues during anesthesia, Set expectations for post-procedure period and Allowed opportunity for questions and acknowledgement of understanding      Vitals:  Vitals Value Taken Time   BP     Temp     Pulse     Resp     SpO2         Electronically Signed By: NURY Anderson CRNA  August 30, 2021  10:05 PM

## 2021-08-31 NOTE — L&D DELIVERY NOTE
OB  Delivery Note    Date of procedure: 2021  Indication for procedure:    1. Term intrauterine pregnancy   2. Gestational DM (A1)   3. Fetal intolerance of labor   4. Failure to progress in labor  Type of procedure: pLTCS   Surgeon: Carlee Stubbs MD  Anesthesia: Combined Spinal/Epidural  IVF:  800 mL  EBL: 350 mL, QBL pending  UOP:  150 mL  Pre-Procedure Diagnosis:    1. Term intrauterine pregnancy   2. Gestational DM (A1)   3. Fetal intolerance of labor   4. Failure to progress in labor  Post-Procedure Diagnosis:    Same    Findings: Normal appearing gravid uterus, normal appearing bilateral fallopian tubes and ovaries    Complications: None    Technique:  Ms. Castro is a 35 year old now  who presented to the hospital for an IOL for A1DM and coordination of care as she lives over 2 hours away. Her labor began with AROM and pitocin. She progressed to 2 cm dilated despite over 6 hours of adequate MVU on the IUPC. Throughout the afternoon she needed to go down on pitocin due to fetal intolerance with minimal variability and at 7 pm it was stopped as she had a prolonged deceleration lasting 3 minutes in the 70s. The pitocin had been stopped for over an hour and she continued to have an alternating Cat 2 to Cat 3 FHT. She remained 2 cm dilated. We discussed alternatives to pitocin including cytotec, but discussed that this is not recommended as she was already experiencing fetal intolerance and mechanical with a bulb, but she has already been ruptured for 10 hours and was not interested in trying this. She was desiring a  section.    I met with Susie and her , Jorden, in her labor room to discuss the procedure in detail. We discussed the risks, benefits, alternatives and indications for the  to be performed and potential need for a blood transfusion. These risks include but are not limited to bleeding, infection, and injury to near by structures (including but not  limited to bowel, bladder, baby, blood vessels, nerves, ureters, and fallopian tubes). Blood transfusion risks are small but can include infection, reaction, and fever. After discussion, proper consent forms were signed and placed in her chart. Susie was given the opportunity to ask questions which were answered to her satisfaction. She was then escorted back to the operating room. Upon arrival to the OR, she was assisted onto the operating table without difficulty where the anesthesia was administered. A eduardo catheter was already placed. Fetal well-being was verified with reassuring heart tones. She was fastened to the OR table with a security belt applied to her upper thigh before rotating into the left lateral tilt position. Stocking compression devices were applied and confirmed to be working appropriately. The Bovie grounding pad was applied to her lateral leg. The abdomen was cleaned across the area of planned operation. The operating physician proceeded to scrub according to sterile technique prior to donning sterile gown and gloves.     An formal operative time-out was performed confirming the patient, procedure, medications, allergies and surgical site. All of the operating room attendees were in agreement. Adequate analgesia was confirmed with use of an Allis clamp. An incision was made approximately two finger-breadths above the pubic symphysis and the incision was extended approximately 6 cm laterally from the linea at midline. The subcutaneous tissue was dissected down with a combination of sharp and blunt dissection. Superficial bleeding was made hemostatic with the Bovie. The rectus fascia was incised across the midline and dissected with sharp dissection. The peritoneal cavity was entered bluntly along the midline at the rectus diastasis. With blunt pressure this incision was extended, an Wilner retractor was placed and the contents of the peritoneum were visualized clearly.    The hysterotomy was  created in a U-shaped manner at the level of the lower uterine segment with care to ensure the bladder was tucked out of the way. The incision was extended with application of bilateral blunt force. The amniotic membrane was ruptured to show clear fluid. A single hand was inserted into the pelvis and aided in the delivery of the infant. The cephalic presenting part delivered without difficulty followed by the anterior shoulder and posterior shoulder. Uncomplicated delivery of a vigorous baby boy, Maxwell, was noted thereafter. The umbilical cord was clamped and cut. The  was passed off to the awaiting nursing team.     Fundal massage and gentle downward traction on the umbilical cord aided in the delivery of the intact placenta. The uterus was exteriorized and the uterine cavity was wiped clean with a sterile, wet lap. The cavity was found to be clear of any remaining placental parts or membranes. The hysterotomy incision was closed with an 0-vicryl stitch in a running manner, with each stitch locking the next. A second imbricating layer was placed with an 0-monocryl suture. Adequate hemostasis was noted of the hysterotomy closure.    The uterus was then returned to its anatomic position. The muscle was inspected and found to be hemostatic. Sterile irrigation was performed of the pericolic gutters. The fascial layer was closed with a 0-vicryl suture in a running fashion. The subcutaneous tissue was reapproximated with 0-plain. The skin was closed with 4-0 vicryl. The incision was cleaned and steri-strips applied. A larger primapore bandage was applied on top.     A sterile vaginal exam was performed to ensure a patent cervix and fundal massage was applied to evacuate the uterus of any remaining clots. Ms. Castro was then transitioned back into the birthing bed and transferred to the PACU. She tolerated the procedure well and was in stable condition.       ROXANE ROCHE MD on 2021 at 9:51 PM

## 2021-08-31 NOTE — OR NURSING
Birth of viable baby boy at 2117. Section of cord given to OB nurse. Cord blood to lab per procedure.

## 2021-08-31 NOTE — PROGRESS NOTES
Labor Progress Note    Ms. Castro is feeling much more comfortable now that she has had a new epidural. She was signed out to me this evening from my partner. Ms. Castro presented to the hospital for an IOL for A1DM and coordination of care as she lives over 2 hours away. Her labor began with AROM and pitocin. She progressed to 2 cm dilated despite over 6 hours of adequate MVU on the IUPC. Throughout the afternoon she needed to go down on pitocin due to fetal intolerance with minimal variability and at 7 pm it was stopped as she had a prolonged deceleration lasting 3 minutes in the 70s. The pitocin had been stopped for over an hour and she continued to have an alternating Cat 2 to Cat 3 FHT. She remained 2 cm dilated. We discussed alternatives to pitocin including cytotec, but discussed that this is not recommended as she was already experiencing fetal intolerance and mechanical with a bulb, but she has already been ruptured for 10 hours and was not interested in trying this. She was desiring a  section.    Exam:  /55   Temp 98.1  F (36.7  C)   SpO2 99%    Gen: comfortable    FHT: 130 bpm, minimal to episodes of moderate variability, + rare accels, recurrent late decelerations (Cat 2)    Lewis: 3-4 q 10 minutes contractions     Membranes: AROM blood-tinged fluid at around 1200 this afternoon      Assessment & Plan:  Ms. Castro is a 35 year old  at 39w3d admitted for IOL-A1DM and coordination of care as she lives over 2 hours.    FWB: Cat 2 due to minimal variability, recurrent late decelerations  Labor: Pitocin augmentation since 9:30am, needed to stop pitocin around 7 pm due to prolonged deceleration and minimal variability. She then also started demonstrating recurrent late decelerations    Pain: epidural in place  GBS: positive, PCN adequate coverage  Rh: O+  Rubella: immune    Proceed with primary  section for fetal intolerance of labor and failure to progress in labor.   Ms. Castro is  consented for cesaren delivery, blood products. Risks of surgery were discussed including but not limited to bleeding, infection, and injury to near by structures including but not limited to bowel, bladder, baby, blood vessels, nerves, ureters, and fallopian tubes. She was consented for blood products and risks of blood transfusion discussed including but not limited to infection, reaction, and fever. She demonstrated understanding and all questions were answered. Consent forms signed for C/S,and blood products.      ROXANE ROCHE MD 8:48 PM

## 2021-08-31 NOTE — OP NOTE
OB  Delivery Note     Date of procedure: 2021  Indication for procedure:       1. Term intrauterine pregnancy      2. Gestational DM (A1)      3. Fetal intolerance of labor      4. Failure to progress in labor  Type of procedure: pLTCS   Surgeon: Carlee Stubbs MD  Anesthesia: Combined Spinal/Epidural  IVF:  800 mL  EBL: 350 mL, QBL pending  UOP:  150 mL  Pre-Procedure Diagnosis:       1. Term intrauterine pregnancy      2. Gestational DM (A1)      3. Fetal intolerance of labor      4. Failure to progress in labor  Post-Procedure Diagnosis:       Same     Findings: Normal appearing gravid uterus, normal appearing bilateral fallopian tubes and ovaries     Complications: None     Technique:  Ms. Castro is a 35 year old now  who presented to the hospital for an IOL for A1DM and coordination of care as she lives over 2 hours away. Her labor began with AROM and pitocin. She progressed to 2 cm dilated despite over 6 hours of adequate MVU on the IUPC. Throughout the afternoon she needed to go down on pitocin due to fetal intolerance with minimal variability and at 7 pm it was stopped as she had a prolonged deceleration lasting 3 minutes in the 70s. The pitocin had been stopped for over an hour and she continued to have an alternating Cat 2 to Cat 3 FHT. She remained 2 cm dilated. We discussed alternatives to pitocin including cytotec, but discussed that this is not recommended as she was already experiencing fetal intolerance and mechanical with a bulb, but she has already been ruptured for 10 hours and was not interested in trying this. She was desiring a  section.     I met with Susie and her , Jorden, in her labor room to discuss the procedure in detail. We discussed the risks, benefits, alternatives and indications for the  to be performed and potential need for a blood transfusion. These risks include but are not limited to bleeding, infection, and injury to near by  structures (including but not limited to bowel, bladder, baby, blood vessels, nerves, ureters, and fallopian tubes). Blood transfusion risks are small but can include infection, reaction, and fever. After discussion, proper consent forms were signed and placed in her chart. Susei was given the opportunity to ask questions which were answered to her satisfaction. She was then escorted back to the operating room. Upon arrival to the OR, she was assisted onto the operating table without difficulty where the anesthesia was administered. A eduardo catheter was already placed. Fetal well-being was verified with reassuring heart tones. She was fastened to the OR table with a security belt applied to her upper thigh before rotating into the left lateral tilt position. Stocking compression devices were applied and confirmed to be working appropriately. The Bovie grounding pad was applied to her lateral leg. The abdomen was cleaned across the area of planned operation. The operating physician proceeded to scrub according to sterile technique prior to donning sterile gown and gloves.      An formal operative time-out was performed confirming the patient, procedure, medications, allergies and surgical site. All of the operating room attendees were in agreement. Adequate analgesia was confirmed with use of an Allis clamp. An incision was made approximately two finger-breadths above the pubic symphysis and the incision was extended approximately 6 cm laterally from the linea at midline. The subcutaneous tissue was dissected down with a combination of sharp and blunt dissection. Superficial bleeding was made hemostatic with the Bovie. The rectus fascia was incised across the midline and dissected with sharp dissection. The peritoneal cavity was entered bluntly along the midline at the rectus diastasis. With blunt pressure this incision was extended, an Wilner retractor was placed and the contents of the peritoneum were visualized  clearly.     The hysterotomy was created in a U-shaped manner at the level of the lower uterine segment with care to ensure the bladder was tucked out of the way. The incision was extended with application of bilateral blunt force. The amniotic membrane was ruptured to show clear fluid. A single hand was inserted into the pelvis and aided in the delivery of the infant. The cephalic presenting part delivered without difficulty followed by the anterior shoulder and posterior shoulder. Uncomplicated delivery of a vigorous baby boy, Maxwell, was noted thereafter. The umbilical cord was clamped and cut. The  was passed off to the awaiting nursing team.      Fundal massage and gentle downward traction on the umbilical cord aided in the delivery of the intact placenta. The uterus was exteriorized and the uterine cavity was wiped clean with a sterile, wet lap. The cavity was found to be clear of any remaining placental parts or membranes. The hysterotomy incision was closed with an 0-vicryl stitch in a running manner, with each stitch locking the next. A second imbricating layer was placed with an 0-monocryl suture. Adequate hemostasis was noted of the hysterotomy closure.     The uterus was then returned to its anatomic position. The muscle was inspected and found to be hemostatic. Sterile irrigation was performed of the pericolic gutters. The fascial layer was closed with a 0-vicryl suture in a running fashion. The subcutaneous tissue was reapproximated with 0-plain. The skin was closed with 4-0 vicryl. The incision was cleaned and steri-strips applied. A larger primapore bandage was applied on top.      A sterile vaginal exam was performed to ensure a patent cervix and fundal massage was applied to evacuate the uterus of any remaining clots. Ms. Castro was then transitioned back into the birthing bed and transferred to the PACU. She tolerated the procedure well and was in stable condition.      I asked Dr. Caballero to  be present for the delivery in light of fetal intolerance of labor and fetal heart rate decelerations    ROXANE ROCHE MD on 8/30/2021 at 9:51 PM

## 2021-08-31 NOTE — OR NURSING
The patient received 150 mL of Pitocin 30 units in 500 mL of 0.9% Normal Saline during the OR phase and 50 mL during the PACU phase.

## 2021-08-31 NOTE — PLAN OF CARE
Susie Castro is doing well after  section. Her dressing is still in place and does not have visible drainage on it.. Fundus is firm with light bleeding and no clots. Patient is breast feeding well. Patient has minimal amounts of pain that is well managed with scheduled analgesics.  Patient is tolerating regular diet. Urine output it adequate. Bowel sounds are active.

## 2021-08-31 NOTE — PLAN OF CARE
MD notified of late decels, prolonged late decel and pitocin stopped.  Other interventions included repositioning, fluid bolus and oxygen.  Variability improved. Contractions no longer adequate per IUPC. SVE 2 1/2 cm/50%.   MD to come see patient now.

## 2021-09-01 VITALS
HEART RATE: 90 BPM | RESPIRATION RATE: 12 BRPM | TEMPERATURE: 97.9 F | SYSTOLIC BLOOD PRESSURE: 101 MMHG | DIASTOLIC BLOOD PRESSURE: 59 MMHG | OXYGEN SATURATION: 96 %

## 2021-09-01 PROCEDURE — 250N000013 HC RX MED GY IP 250 OP 250 PS 637: Performed by: STUDENT IN AN ORGANIZED HEALTH CARE EDUCATION/TRAINING PROGRAM

## 2021-09-01 PROCEDURE — 99207 PR NO CHARGE LOS: CPT | Performed by: OBSTETRICS & GYNECOLOGY

## 2021-09-01 RX ORDER — OXYCODONE HYDROCHLORIDE 5 MG/1
5 TABLET ORAL EVERY 4 HOURS PRN
Qty: 20 TABLET | Refills: 0 | Status: SHIPPED | OUTPATIENT
Start: 2021-09-01

## 2021-09-01 RX ORDER — IBUPROFEN 600 MG/1
600 TABLET, FILM COATED ORAL EVERY 6 HOURS PRN
Qty: 30 TABLET | Refills: 0 | Status: SHIPPED | OUTPATIENT
Start: 2021-09-01

## 2021-09-01 RX ORDER — AMOXICILLIN 250 MG
1 CAPSULE ORAL 2 TIMES DAILY PRN
Qty: 20 TABLET | Refills: 0 | Status: SHIPPED | OUTPATIENT
Start: 2021-09-01

## 2021-09-01 RX ADMIN — ACETAMINOPHEN 975 MG: 325 TABLET, FILM COATED ORAL at 05:46

## 2021-09-01 RX ADMIN — ACETAMINOPHEN 975 MG: 325 TABLET, FILM COATED ORAL at 00:58

## 2021-09-01 RX ADMIN — IBUPROFEN 800 MG: 400 TABLET ORAL at 03:50

## 2021-09-01 RX ADMIN — IBUPROFEN 800 MG: 400 TABLET ORAL at 09:25

## 2021-09-01 RX ADMIN — DOCUSATE SODIUM 50 MG AND SENNOSIDES 8.6 MG 1 TABLET: 8.6; 5 TABLET, FILM COATED ORAL at 09:25

## 2021-09-01 NOTE — DISCHARGE SUMMARY
[facility]    Discharge Summary  Obstetrics    Date of Admission:  2021  Date of Discharge:  2021  Discharging Provider: Benji Cortez    Discharge Diagnoses   Failure to progress in labor [O62.2]    Patient Active Problem List   Diagnosis     Normal labor       Procedure/Surgery Information   Procedure: Procedure(s):   SECTION   Surgeon(s): Surgeon(s) and Role:     * Christel Avitia MD - Primary     History of Present Illness   Susie Castro is a 35 year old female who presented with labor induction and had a primary  for FTP and non-reassuring fetal status    Hospital Course   The patient's hospital course was unremarkable.  She recovered as anticipated and experienced no post-operative complications.  On discharge, her pain was well controlled. Vaginal bleeding is similar to peak menstrual flow.  Voiding without difficulty.  Ambulating well and tolerating a normal diet.  No fever or significant wound drainage.  Breastfeeding well.  Infant is stable.  She was discharged on post-partum day #3.    Post-partum hemoglobin:   Hemoglobin   Date Value Ref Range Status   2021 10.9 (L) 11.7 - 15.7 g/dL Final       Benji Cortez MD    Discharge Disposition   Discharged to home   Condition at discharge: Stable    Pending Results   Final pathology results: No pathology submitted    Unresulted Labs Ordered in the Past 30 Days of this Admission     No orders found from 2021 to 2021.          Primary Care Physician   Physician No Ref-Primary    Consultations This Hospital Stay   HOME CARE POST PARTUM/ IP CONSULT  LACTATION IP CONSULT    Discharge Orders      Activity    Activity as tolerated     Reason for your hospital stay    Maternity care     Follow Up    Follow up with provider in 2 weeks and 6 weeks for post-delivery checks     Breast pump    Breast Pump Documentation:  Manual/Electric Pump: To support adequate breast milk production and nutrition  for infant.     I, the undersigned, certify that the above prescribed supplies are medically necessary for this patient and is both reasonable and necessary in reference to accepted standards of medical and necessary in reference to accepted standards of medical practice in the treatment of this patient's condition and is not prescribed as a convenience.     Diet    Resume previous diet     Discharge Medications   Current Discharge Medication List      START taking these medications    Details   ibuprofen (ADVIL/MOTRIN) 600 MG tablet Take 1 tablet (600 mg) by mouth every 6 hours as needed for moderate pain  Qty: 30 tablet, Refills: 0    Associated Diagnoses: S/P  section      oxyCODONE (ROXICODONE) 5 MG tablet Take 1 tablet (5 mg) by mouth every 4 hours as needed  Qty: 20 tablet, Refills: 0    Associated Diagnoses: S/P  section      senna-docusate (SENOKOT-S/PERICOLACE) 8.6-50 MG tablet Take 1 tablet by mouth 2 times daily as needed for constipation  Qty: 20 tablet, Refills: 0    Associated Diagnoses: S/P  section         CONTINUE these medications which have NOT CHANGED    Details   Prenatal MV-Min-Fe Fum-FA-DHA (PRENATAL 1 PO)            Allergies   No Known Allergies

## 2021-09-01 NOTE — PLAN OF CARE
Susie Castro is doing well after  section. Her dressing has been removed and incision look dry and well approximated. Incision is closed with steri strips in place... Fundus is firm with light bleeding and no clots. Patient is breast feeding. Patient has minimal amounts of pain that is well managed with oral analgesics. Patient is ambulating independently in room. She is voiding, spontaneously and without difficulty. Bowel sounds are active.  Patient has verbalized understanding of routine cares and warning signs.

## 2021-09-01 NOTE — PROGRESS NOTES
Patient discharged to home at this time. All discharge instructions have been reviewed. All questions have been answered. Karishma Nieto RN on 9/1/2021 at 12:11 PM

## 2021-09-01 NOTE — PROGRESS NOTES
[facility]    Obstetrics Post-Op / Progress Note    Assessment & Plan   Assessment:  -2 Days Post-Op  Procedure(s):   SECTION    Doing well.  Clean wound without signs of infection.  No excessive bleeding    Plan:  Ambulation encouraged  Discharge later today    Benji Cortez     Interval History   Doing well.  Pain is well-controlled.  No fevers.  No history of wound drainage, warmth or significant erythema.  Good appetite.  Denies chest pain, shortness of breath, nausea or vomiting.  Ambulatory.  Breastfeeding well.    Medications     dextrose 5% lactated ringers Stopped (21 0704)     - MEDICATION INSTRUCTIONS -       - MEDICATION INSTRUCTIONS -       oxytocin in 0.9% NaCl         acetaminophen  975 mg Oral Q6H     ibuprofen  800 mg Oral Q6H     senna-docusate  1 tablet Oral BID    Or     senna-docusate  2 tablet Oral BID     sodium chloride (PF)  3 mL Intracatheter Q8H       Physical Exam   Temp: 97.9  F (36.6  C) Temp src: Temporal BP: 101/59 Pulse: 90   Resp: 12 SpO2: 96 % O2 Device: None (Room air)    There were no vitals filed for this visit.  Vital Signs with Ranges  Temp:  [97.9  F (36.6  C)-98.3  F (36.8  C)] 97.9  F (36.6  C)  Pulse:  [84-97] 90  Resp:  [12-16] 12  BP: (101-118)/(55-61) 101/59  SpO2:  [96 %-97 %] 96 %  No intake/output data recorded.    Uterine fundus is firm, non-tender and at the level of the umbilicus  Incision C/D/I  Extremities Non-tender    Data   Recent Labs   Lab Test 21  0746   AS Negative     Recent Labs   Lab Test 21  0702 21  0746   HGB 10.9* 12.2     No lab results found.

## 2021-10-17 ENCOUNTER — HEALTH MAINTENANCE LETTER (OUTPATIENT)
Age: 36
End: 2021-10-17

## 2022-10-03 ENCOUNTER — HEALTH MAINTENANCE LETTER (OUTPATIENT)
Age: 37
End: 2022-10-03

## 2023-02-11 ENCOUNTER — HEALTH MAINTENANCE LETTER (OUTPATIENT)
Age: 38
End: 2023-02-11

## 2024-03-09 ENCOUNTER — HEALTH MAINTENANCE LETTER (OUTPATIENT)
Age: 39
End: 2024-03-09

## (undated) DEVICE — DRSG STERI STRIP 1/2X4" R1547

## (undated) DEVICE — GLOVE PROTEXIS POWDER FREE SMT 6.5  2D72PT65X

## (undated) DEVICE — PACK C SECTION SMA15CSFCA

## (undated) DEVICE — Device

## (undated) DEVICE — SU PLAIN 3-0 SH 27" G322H

## (undated) DEVICE — SLEEVE COMPRESSION SCD KNEE MED 74022

## (undated) DEVICE — SU VICRYL 0 CT-1 36" J346H

## (undated) DEVICE — SU MONOCRYL 0 CT-1 36" UND Y946H

## (undated) DEVICE — SU VICRYL 4-0 KS 27" UND J662H

## (undated) DEVICE — ESU PENCIL SMOKE EVAC W/ROCKER SWITCH 0703-047-000

## (undated) DEVICE — SOL WATER 1500ML

## (undated) DEVICE — ESU GROUND PAD ADULT W/CORD E7507

## (undated) DEVICE — COVER LIGHT HANDLE LT-F02

## (undated) DEVICE — SU VICRYL 0 CTX 36" UND J978H

## (undated) DEVICE — PAD ABD 5X9" STERILE 9190A

## (undated) DEVICE — PAD CHUX UNDERPAD 30X36" P3036C

## (undated) DEVICE — PREP CHLORAPREP 26ML TINTED ORANGE  260815

## (undated) DEVICE — DRSG MEDIPORE 3 1/2X8" 3570

## (undated) DEVICE — GLOVE BIOGEL 7 LATEX

## (undated) RX ORDER — ONDANSETRON 2 MG/ML
INJECTION INTRAMUSCULAR; INTRAVENOUS
Status: DISPENSED
Start: 2021-08-30

## (undated) RX ORDER — KETOROLAC TROMETHAMINE 30 MG/ML
INJECTION, SOLUTION INTRAMUSCULAR; INTRAVENOUS
Status: DISPENSED
Start: 2021-08-30

## (undated) RX ORDER — FENTANYL CITRATE 50 UG/ML
INJECTION, SOLUTION INTRAMUSCULAR; INTRAVENOUS
Status: DISPENSED
Start: 2021-08-30

## (undated) RX ORDER — LIDOCAINE HYDROCHLORIDE 20 MG/ML
INJECTION, SOLUTION EPIDURAL; INFILTRATION; INTRACAUDAL; PERINEURAL
Status: DISPENSED
Start: 2021-08-30

## (undated) RX ORDER — SODIUM CHLORIDE 9 MG/ML
INJECTION, SOLUTION INTRAVENOUS
Status: DISPENSED
Start: 2021-08-30

## (undated) RX ORDER — OXYTOCIN/0.9 % SODIUM CHLORIDE 30/500 ML
PLASTIC BAG, INJECTION (ML) INTRAVENOUS
Status: DISPENSED
Start: 2021-08-30

## (undated) RX ORDER — AZITHROMYCIN 500 MG/5ML
INJECTION, POWDER, LYOPHILIZED, FOR SOLUTION INTRAVENOUS
Status: DISPENSED
Start: 2021-08-30

## (undated) RX ORDER — PHENYLEPHRINE HYDROCHLORIDE 10 MG/ML
INJECTION INTRAVENOUS
Status: DISPENSED
Start: 2021-08-30

## (undated) RX ORDER — SODIUM CHLORIDE, SODIUM LACTATE, POTASSIUM CHLORIDE, CALCIUM CHLORIDE 600; 310; 30; 20 MG/100ML; MG/100ML; MG/100ML; MG/100ML
INJECTION, SOLUTION INTRAVENOUS
Status: DISPENSED
Start: 2021-08-30

## (undated) RX ORDER — FENTANYL CITRATE-0.9 % NACL/PF 10 MCG/ML
PLASTIC BAG, INJECTION (ML) INTRAVENOUS
Status: DISPENSED
Start: 2021-08-30

## (undated) RX ORDER — CEFAZOLIN SODIUM 2 G/100ML
INJECTION, SOLUTION INTRAVENOUS
Status: DISPENSED
Start: 2021-08-30

## (undated) RX ORDER — DEXMEDETOMIDINE HYDROCHLORIDE 4 UG/ML
INJECTION, SOLUTION INTRAVENOUS
Status: DISPENSED
Start: 2021-08-30